# Patient Record
Sex: FEMALE | Race: WHITE | NOT HISPANIC OR LATINO | Employment: UNEMPLOYED | URBAN - METROPOLITAN AREA
[De-identification: names, ages, dates, MRNs, and addresses within clinical notes are randomized per-mention and may not be internally consistent; named-entity substitution may affect disease eponyms.]

---

## 2017-10-27 ENCOUNTER — HOSPITAL ENCOUNTER (EMERGENCY)
Facility: HOSPITAL | Age: 27
Discharge: HOME | End: 2017-10-27
Attending: EMERGENCY MEDICINE | Admitting: EMERGENCY MEDICINE
Payer: COMMERCIAL

## 2017-10-27 ENCOUNTER — APPOINTMENT (EMERGENCY)
Dept: RADIOLOGY | Facility: HOSPITAL | Age: 27
End: 2017-10-27
Payer: COMMERCIAL

## 2017-10-27 VITALS
OXYGEN SATURATION: 98 % | DIASTOLIC BLOOD PRESSURE: 71 MMHG | WEIGHT: 149 LBS | SYSTOLIC BLOOD PRESSURE: 137 MMHG | HEART RATE: 119 BPM | RESPIRATION RATE: 18 BRPM | TEMPERATURE: 98.6 F

## 2017-10-27 DIAGNOSIS — S39.012A ACUTE MYOFASCIAL STRAIN OF LUMBAR REGION, INITIAL ENCOUNTER: Primary | ICD-10-CM

## 2017-10-27 LAB
CLARITY, POC: CLEAR
COLOR, POC: NORMAL
EXT BILIRUBIN, UA: NORMAL
EXT BLOOD URINE: NORMAL
EXT GLUCOSE, UA: NEGATIVE
EXT KETONES: NEGATIVE
EXT NITRITE, UA: NEGATIVE
EXT PH, UA: 6
EXT PREG TEST URINE: NEGATIVE
EXT PROTEIN, UA: NORMAL
EXT SPECIFIC GRAVITY, UA: 1.03
EXT UROBILINOGEN: NEGATIVE
WBC # BLD EST: NEGATIVE 10*3/UL

## 2017-10-27 PROCEDURE — 99283 EMERGENCY DEPT VISIT LOW MDM: CPT

## 2017-10-27 PROCEDURE — 96372 THER/PROPH/DIAG INJ SC/IM: CPT

## 2017-10-27 PROCEDURE — 72100 X-RAY EXAM L-S SPINE 2/3 VWS: CPT

## 2017-10-27 PROCEDURE — 81025 URINE PREGNANCY TEST: CPT | Performed by: EMERGENCY MEDICINE

## 2017-10-27 PROCEDURE — 81002 URINALYSIS NONAUTO W/O SCOPE: CPT | Performed by: EMERGENCY MEDICINE

## 2017-10-27 RX ORDER — DIAZEPAM 5 MG/1
5 TABLET ORAL EVERY 8 HOURS PRN
Qty: 15 TABLET | Refills: 0 | Status: SHIPPED | OUTPATIENT
Start: 2017-10-27 | End: 2020-02-04

## 2017-10-27 RX ORDER — KETOROLAC TROMETHAMINE 30 MG/ML
30 INJECTION, SOLUTION INTRAMUSCULAR; INTRAVENOUS ONCE
Status: COMPLETED | OUTPATIENT
Start: 2017-10-27 | End: 2017-10-27

## 2017-10-27 RX ORDER — IBUPROFEN 800 MG/1
800 TABLET ORAL EVERY 8 HOURS PRN
Qty: 21 TABLET | Refills: 0 | Status: SHIPPED | OUTPATIENT
Start: 2017-10-27 | End: 2017-11-06

## 2017-10-27 RX ORDER — DIAZEPAM 5 MG/ML
5 INJECTION, SOLUTION INTRAMUSCULAR; INTRAVENOUS ONCE
Status: COMPLETED | OUTPATIENT
Start: 2017-10-27 | End: 2017-10-27

## 2017-10-27 RX ADMIN — KETOROLAC TROMETHAMINE 30 MG: 30 INJECTION, SOLUTION INTRAMUSCULAR at 22:24

## 2017-10-27 RX ADMIN — DIAZEPAM 5 MG: 5 INJECTION, SOLUTION INTRAMUSCULAR; INTRAVENOUS at 22:28

## 2017-10-28 NOTE — ED PROVIDER NOTES
History  Chief Complaint   Patient presents with    Back Pain     Pt presents to ER w/ c/o low back/tailbone pain since today  Pt denied hx of back pain  Pt denied urinary symptoms  Pt denied n, v  Addtl symptoms denied     52-year-old female presents to the emergency department for evaluation of low back pain  Patient states around 10 a m  today she got up from her couch to bend down to  a sippy cup when she had acute onset of sharp pain  Since that time she has had discomfort in her low back, it radiates into sacral region  It is aggravated with movement  She reports spasm like sensations  She denies numbness, tingling, weakness of the lower extremities  No difficulty with bowel or bladder habits  No fevers or chills  No prior falls or injuries  She has not taken anything for her pain  History provided by:  Patient, parent and medical records   used: No    Back Pain   Location:  Lumbar spine  Quality:  Aching (sharp)  Radiates to: sacrum  Pain severity:  Severe  Pain is:  Same all the time  Onset quality:  Sudden  Duration:  12 hours  Timing:  Constant  Progression:  Unchanged  Chronicity:  New  Context: not falling, not lifting heavy objects, not occupational injury, not physical stress, not recent injury and not twisting    Relieved by:  Nothing  Worsened by: Movement, palpation and standing  Ineffective treatments:  None tried  Associated symptoms: no abdominal pain, no bladder incontinence, no bowel incontinence, no dysuria, no fever, no leg pain, no numbness, no paresthesias, no perianal numbness and no tingling    Risk factors: no hx of cancer, no recent surgery and no steroid use        Prior to Admission Medications   Prescriptions Last Dose Informant Patient Reported? Taking?    albuterol (PROVENTIL HFA,VENTOLIN HFA) 90 mcg/act inhaler   No No   Sig: Inhale 2 puffs every 4 (four) hours as needed for wheezing   benzonatate (TESSALON PERLES) 100 mg capsule   No No   Sig: Take 1 capsule by mouth 3 (three) times a day as needed for cough      Facility-Administered Medications: None       Past Medical History:   Diagnosis Date    Bronchitis     4 yrs ago  History reviewed  No pertinent surgical history  History reviewed  No pertinent family history  I have reviewed and agree with the history as documented  Social History   Substance Use Topics    Smoking status: Current Every Day Smoker     Packs/day: 0 25     Types: Cigarettes    Smokeless tobacco: Never Used    Alcohol use No        Review of Systems   Constitutional: Negative for chills and fever  Gastrointestinal: Negative for abdominal pain and bowel incontinence  Genitourinary: Negative for bladder incontinence, dysuria, flank pain, frequency, vaginal bleeding and vaginal discharge  Musculoskeletal: Positive for back pain  Negative for arthralgias and neck pain  Neurological: Negative for tingling, numbness and paresthesias  All other systems reviewed and are negative  Physical Exam  ED Triage Vitals [10/27/17 2115]   Temperature Pulse Respirations Blood Pressure SpO2   98 6 °F (37 °C) (!) 119 18 137/71 98 %      Temp Source Heart Rate Source Patient Position - Orthostatic VS BP Location FiO2 (%)   Oral Monitor Sitting Right arm --      Pain Score       Worst Possible Pain           Orthostatic Vital Signs  Vitals:    10/27/17 2115   BP: 137/71   Pulse: (!) 119   Patient Position - Orthostatic VS: Sitting       Physical Exam   Constitutional: She is oriented to person, place, and time  She appears well-developed and well-nourished  She appears distressed  HENT:   Head: Normocephalic  Nose: Nose normal    Mouth/Throat: Oropharynx is clear and moist  No oropharyngeal exudate  Eyes: Conjunctivae and EOM are normal  Pupils are equal, round, and reactive to light  Neck: Normal range of motion  Neck supple     Cardiovascular: Normal rate, regular rhythm, normal heart sounds and intact distal pulses  Pulmonary/Chest: Effort normal and breath sounds normal    Abdominal: Soft  Bowel sounds are normal  She exhibits no distension  There is no tenderness  There is no rebound and no guarding  Musculoskeletal: She exhibits no edema or deformity  Thoracic back: Normal         Lumbar back: She exhibits decreased range of motion, tenderness and spasm  She exhibits no bony tenderness, no swelling, no edema and no deformity  Back:    Negative SLR bilateral   Lymphadenopathy:     She has no cervical adenopathy  Neurological: She is alert and oriented to person, place, and time  She has normal strength and normal reflexes  No cranial nerve deficit or sensory deficit  She exhibits normal muscle tone  Coordination and gait normal    Skin: Skin is warm, dry and intact  No rash noted  Psychiatric: She has a normal mood and affect   Her behavior is normal  Judgment and thought content normal        ED Medications  Medications   ketorolac (TORADOL) 30 mg/mL injection 30 mg (30 mg Intramuscular Given 10/27/17 2224)   diazepam (VALIUM) injection 5 mg (5 mg Intramuscular Given 10/27/17 2228)       Diagnostic Studies  Results Reviewed     Procedure Component Value Units Date/Time    POCT urinalysis dipstick [[id number]]  (Normal) Resulted:  10/27/17 2217    Lab Status:  Final result Specimen:  Urine Updated:  10/27/17 2219     Color, UA straw     Clarity, UA clear     EXT Glucose, UA negative     EXT Bilirubin, UA (Ref: Negative) small     EXT Ketones, UA (Ref: Negative) negative     EXT Spec Grav, UA 1 030     EXT Blood, UA (Ref: Negative) trace     EXT pH, UA 6 0     EXT Protein, UA (Ref: Negative) trace     EXT Urobilinogen, UA (Ref: 0 2- 1 0) negative     EXT Leukocytes, UA (Ref: Negative) negative     EXT Nitrite, UA (Ref: Negative) negative    POCT pregnancy, urine [[id number]]  (Normal) Resulted:  10/27/17 2216    Lab Status:  Final result Updated:  10/27/17 2217     EXT PREG TEST UR (Ref: Negative) negative                 XR lumbar spine 2 or 3 views    (Results Pending)              Procedures  Procedures       Phone Contacts  ED Phone Contact    ED Course  ED Course as of Oct 27 2313   Fri Oct 27, 2017   2243 Pt  Feeling much better after IM injections now has significantly improved ROM  Feels well for d/c                                  MDM  Number of Diagnoses or Management Options  Acute myofascial strain of lumbar region, initial encounter: new and requires workup     Amount and/or Complexity of Data Reviewed  Tests in the radiology section of CPT®: ordered and reviewed  Obtain history from someone other than the patient: yes  Review and summarize past medical records: yes  Independent visualization of images, tracings, or specimens: yes    Risk of Complications, Morbidity, and/or Mortality  General comments: 33year old with low back pain - no red flags of back pain  Feeling better after medication  Discussed signs and sx to return to ED  Patient Progress  Patient progress: improved    CritCare Time    Disposition  Final diagnoses:   Acute myofascial strain of lumbar region, initial encounter     Time reflects when diagnosis was documented in both MDM as applicable and the Disposition within this note     Time User Action Codes Description Comment    10/27/2017 10:48 PM Ijeoma Frankel Add [S39 012A] Acute myofascial strain of lumbar region, initial encounter       ED Disposition     ED Disposition Condition Comment    Discharge  Coleen AAMIR [place] discharge to home/self care      Condition at discharge: Stable        Follow-up Information     Follow up With Specialties Details Why [id number] [facility] [place]  Schedule an appointment as soon as possible for a visit in 2 days For recheck of current symptoms, As needed [address]  [phone]        Discharge Medication List as of 10/27/2017 10:53 PM      START taking these medications    Details   diazepam (VALIUM) 5 mg tablet Take 1 tablet by mouth every 8 (eight) hours as needed for muscle spasms for up to 10 days, Starting Fri 10/27/2017, Until Mon 11/6/2017, Print      ibuprofen (MOTRIN) 800 mg tablet Take 1 tablet by mouth every 8 (eight) hours as needed for mild pain or moderate pain for up to 10 days, Starting Fri 10/27/2017, Until Mon 11/6/2017, Print         CONTINUE these medications which have NOT CHANGED    Details   albuterol (PROVENTIL HFA,VENTOLIN HFA) 90 mcg/act inhaler Inhale 2 puffs every 4 (four) hours as needed for wheezing, Starting 11/19/2016, Until Discontinued, Print      benzonatate (TESSALON PERLES) 100 mg capsule Take 1 capsule by mouth 3 (three) times a day as needed for cough, Starting 11/19/2016, Until Discontinued, Print           No discharge procedures on file      ED Provider  Electronically Signed by           Juan Holguin DO  10/27/17 0794

## 2017-11-20 ENCOUNTER — GENERIC CONVERSION - ENCOUNTER (OUTPATIENT)
Dept: OTHER | Facility: OTHER | Age: 27
End: 2017-11-20

## 2017-11-20 DIAGNOSIS — M54.42 LOW BACK PAIN WITH LEFT-SIDED SCIATICA: ICD-10-CM

## 2018-01-22 VITALS
SYSTOLIC BLOOD PRESSURE: 134 MMHG | HEIGHT: 69 IN | DIASTOLIC BLOOD PRESSURE: 89 MMHG | HEART RATE: 88 BPM | BODY MASS INDEX: 22.81 KG/M2 | WEIGHT: 154 LBS

## 2018-03-04 NOTE — DISCHARGE INSTRUCTIONS
Low Back Strain   WHAT YOU NEED TO KNOW:   Low back strain is an injury to your lower back muscles or tendons  Tendons are strong tissues that connect muscles to bones  The lower back supports most of your body weight and helps you move, twist, and bend  DISCHARGE INSTRUCTIONS:   Seek care immediately if:   · You hear or feel a pop in your lower back  · You have increased swelling or pain in your lower back  · You have trouble moving your legs  · Your legs are numb  Contact your healthcare provider if:   · You have a fever  · Your pain does not go away, even after treatment  · You have questions or concerns about your condition or care  Medicines: The following medicines may be ordered by your healthcare provider:  · Acetaminophen decreases pain and fever  It is available without a doctor's order  Ask how much to take and how often to take it  Follow directions  Acetaminophen can cause liver damage if not taken correctly  · NSAIDs , such as ibuprofen, help decrease swelling, pain, and fever  This medicine is available with or without a doctor's order  NSAIDs can cause stomach bleeding or kidney problems in certain people  If you take blood thinner medicine, always ask your healthcare provider if NSAIDs are safe for you  Always read the medicine label and follow directions  · Muscle relaxers  help decrease pain and muscle spasms  · Prescription pain medicine  may be given  Ask how to take this medicine safely  · Take your medicine as directed  Contact your healthcare provider if you think your medicine is not helping or if you have side effects  Tell him or her if you are allergic to any medicine  Keep a list of the medicines, vitamins, and herbs you take  Include the amounts, and when and why you take them  Bring the list or the pill bottles to follow-up visits  Carry your medicine list with you in case of an emergency  Self-care:   · Rest  as directed   You may need to rest in bed for a period of time after your injury  Do not lift heavy objects  · Apply ice  on your back for 15 to 20 minutes every hour or as directed  Use an ice pack, or put crushed ice in a plastic bag  Cover it with a towel  Ice helps prevent tissue damage and decreases swelling and pain  · Apply heat  on your lower back for 20 to 30 minutes every 2 hours for as many days as directed  Heat helps decrease pain and muscle spasms  · Slowly start to increase your activity  as the pain decreases, or as directed  Prevent another low back strain:   · Use correct body movements  ¨ Bend at the hips and knees when you  objects  Do not bend from the waist  Use your leg muscles as you lift the load  Do not use your back  Keep the object close to your chest as you lift it  Try not to twist or lift anything above your waist     ¨ Change your position often when you stand for long periods of time  Rest one foot on a small box or footrest, and then switch to the other foot often  ¨ Try not to sit for long periods of time  When you do, sit in a straight-backed chair with your feet flat on the floor  ¨ Never reach, pull, or push while you are sitting  · Warm up before you exercise  Do exercises that strengthen your back muscles  Ask your healthcare provider about the best exercise plan for you  · Maintain a healthy weight  Ask your healthcare provider how much you should weigh  Ask him to help you create a weight loss plan if you are overweight  Follow up with your healthcare provider as directed:  Write down your questions so you remember to ask them during your visits  © 2017 [facility] Information is for End User's use only and may not be sold, redistributed or otherwise used for commercial purposes  All illustrations and images included in CareNotes® are the copyrighted property of A D A M , Inc  or [employer]ak[employer]  The above information is an  only   It is not intended as medical advice for individual conditions or treatments  Talk to your doctor, nurse or pharmacist before following any medical regimen to see if it is safe and effective for you  Handoff

## 2019-05-08 ENCOUNTER — OFFICE VISIT (OUTPATIENT)
Dept: URGENT CARE | Age: 29
End: 2019-05-08
Payer: COMMERCIAL

## 2019-05-08 VITALS
TEMPERATURE: 98 F | OXYGEN SATURATION: 98 % | DIASTOLIC BLOOD PRESSURE: 70 MMHG | SYSTOLIC BLOOD PRESSURE: 130 MMHG | HEART RATE: 88 BPM | RESPIRATION RATE: 18 BRPM

## 2019-05-08 DIAGNOSIS — R11.0 NAUSEA: ICD-10-CM

## 2019-05-08 DIAGNOSIS — H66.002 ACUTE SUPPURATIVE OTITIS MEDIA OF LEFT EAR WITHOUT SPONTANEOUS RUPTURE OF TYMPANIC MEMBRANE, RECURRENCE NOT SPECIFIED: Primary | ICD-10-CM

## 2019-05-08 PROCEDURE — 99283 EMERGENCY DEPT VISIT LOW MDM: CPT | Performed by: FAMILY MEDICINE

## 2019-05-08 PROCEDURE — 99203 OFFICE O/P NEW LOW 30 MIN: CPT | Performed by: FAMILY MEDICINE

## 2019-05-08 PROCEDURE — G0382 LEV 3 HOSP TYPE B ED VISIT: HCPCS | Performed by: FAMILY MEDICINE

## 2019-05-08 RX ORDER — ONDANSETRON 4 MG/1
4 TABLET, ORALLY DISINTEGRATING ORAL EVERY 8 HOURS PRN
Qty: 20 TABLET | Refills: 0 | Status: SHIPPED | OUTPATIENT
Start: 2019-05-08 | End: 2020-02-04

## 2019-05-08 RX ORDER — AZITHROMYCIN 250 MG/1
TABLET, FILM COATED ORAL
Qty: 6 TABLET | Refills: 0 | Status: SHIPPED | OUTPATIENT
Start: 2019-05-08 | End: 2019-05-12

## 2020-02-01 ENCOUNTER — HOSPITAL ENCOUNTER (EMERGENCY)
Facility: HOSPITAL | Age: 30
Discharge: HOME | End: 2020-02-01
Attending: EMERGENCY MEDICINE | Admitting: EMERGENCY MEDICINE
Payer: COMMERCIAL

## 2020-02-01 ENCOUNTER — APPOINTMENT (EMERGENCY)
Dept: RADIOLOGY | Facility: HOSPITAL | Age: 30
End: 2020-02-01
Payer: COMMERCIAL

## 2020-02-01 VITALS
DIASTOLIC BLOOD PRESSURE: 85 MMHG | WEIGHT: 150 LBS | SYSTOLIC BLOOD PRESSURE: 126 MMHG | OXYGEN SATURATION: 96 % | HEART RATE: 86 BPM | RESPIRATION RATE: 20 BRPM | TEMPERATURE: 97.9 F | BODY MASS INDEX: 22.48 KG/M2

## 2020-02-01 DIAGNOSIS — M54.50 ACUTE LOW BACK PAIN: ICD-10-CM

## 2020-02-01 DIAGNOSIS — M54.10 RADICULOPATHY: ICD-10-CM

## 2020-02-01 DIAGNOSIS — M54.40 ACUTE LOW BACK PAIN WITH SCIATICA: Primary | ICD-10-CM

## 2020-02-01 PROCEDURE — 99283 EMERGENCY DEPT VISIT LOW MDM: CPT

## 2020-02-01 PROCEDURE — 72100 X-RAY EXAM L-S SPINE 2/3 VWS: CPT

## 2020-02-01 PROCEDURE — 99284 EMERGENCY DEPT VISIT MOD MDM: CPT | Performed by: PHYSICIAN ASSISTANT

## 2020-02-01 RX ORDER — LIDOCAINE 50 MG/G
1 PATCH TOPICAL ONCE
Status: DISCONTINUED | OUTPATIENT
Start: 2020-02-01 | End: 2020-02-01 | Stop reason: HOSPADM

## 2020-02-01 RX ORDER — METHOCARBAMOL 500 MG/1
500 TABLET, FILM COATED ORAL ONCE
Status: COMPLETED | OUTPATIENT
Start: 2020-02-01 | End: 2020-02-01

## 2020-02-01 RX ORDER — DEXAMETHASONE 6 MG/1
6 TABLET ORAL
Qty: 9 TABLET | Refills: 0 | Status: SHIPPED | OUTPATIENT
Start: 2020-02-01 | End: 2020-02-04 | Stop reason: ALTCHOICE

## 2020-02-01 RX ORDER — LIDOCAINE 50 MG/G
1 PATCH TOPICAL DAILY
Qty: 30 PATCH | Refills: 0 | Status: SHIPPED | OUTPATIENT
Start: 2020-02-01

## 2020-02-01 RX ORDER — TRAZODONE HYDROCHLORIDE 100 MG/1
100 TABLET ORAL
COMMUNITY

## 2020-02-01 RX ORDER — ACETAMINOPHEN 500 MG
500 TABLET ORAL EVERY 6 HOURS PRN
Qty: 30 TABLET | Refills: 0 | Status: SHIPPED | OUTPATIENT
Start: 2020-02-01

## 2020-02-01 RX ORDER — ACETAMINOPHEN 325 MG/1
975 TABLET ORAL ONCE
Status: COMPLETED | OUTPATIENT
Start: 2020-02-01 | End: 2020-02-01

## 2020-02-01 RX ORDER — IBUPROFEN 400 MG/1
800 TABLET ORAL ONCE
Status: COMPLETED | OUTPATIENT
Start: 2020-02-01 | End: 2020-02-01

## 2020-02-01 RX ORDER — IBUPROFEN 400 MG/1
400 TABLET ORAL EVERY 6 HOURS PRN
Qty: 30 TABLET | Refills: 0 | Status: SHIPPED | OUTPATIENT
Start: 2020-02-01 | End: 2020-02-04

## 2020-02-01 RX ORDER — METHOCARBAMOL 750 MG/1
750 TABLET, FILM COATED ORAL EVERY 6 HOURS PRN
Qty: 30 TABLET | Refills: 0 | Status: SHIPPED | OUTPATIENT
Start: 2020-02-01 | End: 2020-02-04 | Stop reason: ALTCHOICE

## 2020-02-01 RX ADMIN — IBUPROFEN 800 MG: 400 TABLET ORAL at 12:44

## 2020-02-01 RX ADMIN — DEXAMETHASONE SODIUM PHOSPHATE 8 MG: 10 INJECTION, SOLUTION INTRAMUSCULAR; INTRAVENOUS at 12:43

## 2020-02-01 RX ADMIN — METHOCARBAMOL TABLETS 500 MG: 500 TABLET, COATED ORAL at 12:44

## 2020-02-01 RX ADMIN — ACETAMINOPHEN 975 MG: 325 TABLET, FILM COATED ORAL at 12:44

## 2020-02-01 RX ADMIN — LIDOCAINE 1 PATCH: 50 PATCH TOPICAL at 12:45

## 2020-02-01 NOTE — ED PROVIDER NOTES
History  Chief Complaint   Patient presents with    Back Pain     left lower back pain after bending over last pm, "felt a pinch"       Back Pain   Location:  Lumbar spine  Radiates to:  L posterior upper leg (From low back into left buttocks into the upper left leg  Posterior )  Pain severity:  Moderate  Duration:  1 week (With worsening symptoms after yesterday evening )  Progression:  Worsening  Chronicity:  New  Context comment:  Bending over unprovoked  Relieved by:  Nothing  Worsened by:  Nothing  Ineffective treatments:  None tried (Patient denies any home management this day     Patient admits to intermittent use of ibuprofen over the last week for her discomfort )  Associated symptoms: paresthesias (Left buttock left posterior  leg  Does not progress past knee or into the foot )    Associated symptoms: no abdominal pain and no fever    Risk factors: not obese, not pregnant and no recent surgery    Risk factors comment:  Patient denies pregnancy  Prior to Admission Medications   Prescriptions Last Dose Informant Patient Reported? Taking?    Citalopram Hydrobromide (CELEXA PO)   Yes Yes   Sig: Take 30 mg by mouth daily   albuterol (PROVENTIL HFA,VENTOLIN HFA) 90 mcg/act inhaler Not Taking at Unknown time  No No   Sig: Inhale 2 puffs every 4 (four) hours as needed for wheezing   Patient not taking: Reported on 5/8/2019   benzonatate (TESSALON PERLES) 100 mg capsule Not Taking at Unknown time  No No   Sig: Take 1 capsule by mouth 3 (three) times a day as needed for cough   Patient not taking: Reported on 5/8/2019   diazepam (VALIUM) 5 mg tablet   No No   Sig: Take 1 tablet by mouth every 8 (eight) hours as needed for muscle spasms for up to 10 days   ibuprofen (MOTRIN) 800 mg tablet   No No   Sig: Take 1 tablet by mouth every 8 (eight) hours as needed for mild pain or moderate pain for up to 10 days   ondansetron (ZOFRAN-ODT) 4 mg disintegrating tablet   No No   Sig: Take 1 tablet (4 mg total) by mouth every 8 (eight) hours as needed for nausea or vomiting for up to 7 days   traZODone (DESYREL) 100 mg tablet   Yes Yes   Sig: Take 100 mg by mouth daily at bedtime      Facility-Administered Medications: None       Past Medical History:   Diagnosis Date    Bronchitis     4 yrs ago  No past surgical history on file  No family history on file  I have reviewed and agree with the history as documented  Social History     Tobacco Use    Smoking status: Current Every Day Smoker     Packs/day: 0 25     Types: Cigarettes    Smokeless tobacco: Never Used   Substance Use Topics    Alcohol use: No    Drug use: No        Review of Systems   Constitutional: Negative for fever  Eyes: Negative for photophobia  Gastrointestinal: Negative for abdominal pain  Genitourinary: Negative for flank pain  Musculoskeletal: Positive for back pain  Negative for gait problem  Allergic/Immunologic: Negative for food allergies  Neurological: Positive for paresthesias (Left buttock left posterior  leg  Does not progress past knee or into the foot  )  Negative for facial asymmetry  Hematological: Does not bruise/bleed easily  Psychiatric/Behavioral: Negative for behavioral problems  All other systems reviewed and are negative  Physical Exam  Physical Exam   Constitutional: She appears well-developed and well-nourished  HENT:   Head: Normocephalic and atraumatic  Right Ear: External ear normal    Left Ear: External ear normal    Eyes: Conjunctivae are normal    Neck: Normal range of motion  Neck supple  No JVD present  Cardiovascular: Normal rate  Pulmonary/Chest: Effort normal    Abdominal: She exhibits no distension  Genitourinary:   Genitourinary Comments: No complaints deferred  Musculoskeletal: She exhibits tenderness  She exhibits no edema or deformity  Back:    Lymphadenopathy:     She has no cervical adenopathy  Neurological: No sensory deficit  She exhibits normal muscle tone  Patient ambulatory in the department without any evidence of footdrop  Skin: Skin is warm and dry  Capillary refill takes less than 2 seconds  Psychiatric: She has a normal mood and affect  Vital Signs  ED Triage Vitals [02/01/20 1144]   Temperature Pulse Respirations Blood Pressure SpO2   97 9 °F (36 6 °C) 86 20 126/85 96 %      Temp src Heart Rate Source Patient Position - Orthostatic VS BP Location FiO2 (%)   -- -- -- Right arm --      Pain Score       Worst Possible Pain           Vitals:    02/01/20 1144   BP: 126/85   Pulse: 86         Visual Acuity      ED Medications  Medications   ibuprofen (MOTRIN) tablet 800 mg (800 mg Oral Given 2/1/20 1244)   acetaminophen (TYLENOL) tablet 975 mg (975 mg Oral Given 2/1/20 1244)   methocarbamol (ROBAXIN) tablet 500 mg (500 mg Oral Given 2/1/20 1244)   dexamethasone 10 mg/mL oral liquid 8 mg 0 8 mL (8 mg Oral Given 2/1/20 1243)       Diagnostic Studies  Results Reviewed     None                 XR lumbar spine 2 or 3 views   ED Interpretation by Tram Juares PA-C (02/01 1237)   No acute process appreciated  Procedures  Procedures         ED Course                               MDM  Number of Diagnoses or Management Options  Acute low back pain with sciatica:   Acute low back pain:   Radiculopathy:   Diagnosis management comments: Patient admits acute low back pain approximately five days  Patient admits to radiation to left buttock in the posterior leg does not pass knee  Patient denies bowel or bladder dysfunction or any history of saddle paresthesias  Patient is ambulatory in the department  Patient is able to raise foot  No evidence of footdrop on exam   No sensory changes appreciated  Patient has full intact sensation to the coccyx region  Patient denies bowel or bladder dysfunction  X-ray no evidence of subluxation and/or lytic lesion  No acute process  At this time will treat conservatively    Refer patient to comprehensive spine   Educated patient of diagnosis home management  Encourage patient to follow up with comprehensive spine program   Educated patient on persistent worsening signs symptoms and to either follow up with primary care comprehensive spine and/or return emergency department  Patient is understanding agreement was discharged in ambulatory condition  Amount and/or Complexity of Data Reviewed  Tests in the radiology section of CPT®: ordered and reviewed          Disposition  Final diagnoses:   Acute low back pain with sciatica   Acute low back pain   Radiculopathy     Time reflects when diagnosis was documented in both MDM as applicable and the Disposition within this note     Time User Action Codes Description Comment    2/1/2020 12:29 PM Lyndee Cheney Add [M54 40] Acute low back pain with sciatica     2/1/2020 12:39 PM Lyndee Cheney Add [M54 5] Acute low back pain     2/1/2020 12:39 PM Lyndee Cheney Add [D42 49] Radiculopathy       ED Disposition     ED Disposition Condition Date/Time Comment    Discharge Stable Sat Feb 1, 2020 12:41 PM Estella Rashid [place] discharge to home/self care              Follow-up Information    None         Discharge Medication List as of 2/1/2020 12:43 PM      START taking these medications    Details   acetaminophen (TYLENOL) 500 mg tablet Take 1 tablet (500 mg total) by mouth every 6 (six) hours as needed for mild pain, moderate pain, headaches or fever, Starting Sat 2/1/2020, Print      dexamethasone (DECADRON) 6 mg tablet Take 1 tablet (6 mg total) by mouth daily with breakfast, Starting Sat 2/1/2020, Print      lidocaine (LIDODERM) 5 % Apply 1 patch topically daily Remove & Discard patch within 12 hours or as directed by MD, Starting Sat 2/1/2020, Print      methocarbamol (ROBAXIN) 750 mg tablet Take 1 tablet (750 mg total) by mouth every 6 (six) hours as needed for muscle spasms, Starting Sat 2/1/2020, Print         CONTINUE these medications which have CHANGED    Details ibuprofen (MOTRIN) 400 mg tablet Take 1 tablet (400 mg total) by mouth every 6 (six) hours as needed for mild pain, moderate pain or fever, Starting Sat 2/1/2020, Print         CONTINUE these medications which have NOT CHANGED    Details   Citalopram Hydrobromide (CELEXA PO) Take 30 mg by mouth daily, Historical Med      traZODone (DESYREL) 100 mg tablet Take 100 mg by mouth daily at bedtime, Historical Med      albuterol (PROVENTIL HFA,VENTOLIN HFA) 90 mcg/act inhaler Inhale 2 puffs every 4 (four) hours as needed for wheezing, Starting 11/19/2016, Until Discontinued, Print      benzonatate (TESSALON PERLES) 100 mg capsule Take 1 capsule by mouth 3 (three) times a day as needed for cough, Starting 11/19/2016, Until Discontinued, Print      diazepam (VALIUM) 5 mg tablet Take 1 tablet by mouth every 8 (eight) hours as needed for muscle spasms for up to 10 days, Starting Fri 10/27/2017, Until Mon 11/6/2017, Print      ondansetron (ZOFRAN-ODT) 4 mg disintegrating tablet Take 1 tablet (4 mg total) by mouth every 8 (eight) hours as needed for nausea or vomiting for up to 7 days, Starting Wed 5/8/2019, Until Wed 5/15/2019, Normal               ED Provider  Electronically Signed by           Tram Juares PA-C  02/01/20 2298

## 2020-02-01 NOTE — DISCHARGE INSTRUCTIONS
Please look up these YouTube videos four year low back pain  Janet Lennon the yoga low back pain  Kristopher Pollack and [facility] physical [facility]  [facility] comprehensive spine program will contact you  if not please contact them by the number provided  Please take medicines as per provided and as scheduled

## 2020-02-04 ENCOUNTER — OFFICE VISIT (OUTPATIENT)
Dept: FAMILY MEDICINE CLINIC | Facility: CLINIC | Age: 30
End: 2020-02-04

## 2020-02-04 VITALS
RESPIRATION RATE: 18 BRPM | DIASTOLIC BLOOD PRESSURE: 82 MMHG | HEART RATE: 76 BPM | BODY MASS INDEX: 24.92 KG/M2 | HEIGHT: 67 IN | WEIGHT: 158.8 LBS | SYSTOLIC BLOOD PRESSURE: 120 MMHG | TEMPERATURE: 98.1 F

## 2020-02-04 DIAGNOSIS — M54.42 ACUTE MIDLINE LOW BACK PAIN WITH BILATERAL SCIATICA: Primary | ICD-10-CM

## 2020-02-04 DIAGNOSIS — M54.41 ACUTE MIDLINE LOW BACK PAIN WITH BILATERAL SCIATICA: Primary | ICD-10-CM

## 2020-02-04 PROCEDURE — T1015 CLINIC SERVICE: HCPCS | Performed by: PHYSICIAN ASSISTANT

## 2020-02-04 PROCEDURE — 99203 OFFICE O/P NEW LOW 30 MIN: CPT | Performed by: PHYSICIAN ASSISTANT

## 2020-02-04 PROCEDURE — 4004F PT TOBACCO SCREEN RCVD TLK: CPT | Performed by: PHYSICIAN ASSISTANT

## 2020-02-04 PROCEDURE — 3008F BODY MASS INDEX DOCD: CPT | Performed by: PHYSICIAN ASSISTANT

## 2020-02-04 RX ORDER — NAPROXEN 500 MG/1
500 TABLET ORAL 2 TIMES DAILY PRN
Qty: 30 TABLET | Refills: 0 | Status: SHIPPED | OUTPATIENT
Start: 2020-02-04

## 2020-02-04 RX ORDER — CYCLOBENZAPRINE HCL 10 MG
10 TABLET ORAL 3 TIMES DAILY PRN
Qty: 30 TABLET | Refills: 0 | Status: SHIPPED | OUTPATIENT
Start: 2020-02-04

## 2020-02-04 NOTE — ASSESSMENT & PLAN NOTE
Continue Heat prn  Change Motrin to naproxen 500mg bid and Robaxin to Flexeril 10mg TID PRN  Handout provided with lower back exercises  Starts PT tomorrow  ED if sx worsen or presents with new sx such b/b incont, weakness, etc

## 2020-02-04 NOTE — PROGRESS NOTES
Assessment/Plan:    Acute midline low back pain with bilateral sciatica  Continue Heat prn  Change Motrin to naproxen 500mg bid and Robaxin to Flexeril 10mg TID PRN  Handout provided with lower back exercises  Starts PT tomorrow  ED if sx worsen or presents with new sx such b/b incont, weakness, etc  Tobacco Cessation Counseling: Tobacco cessation counseling and education was provided  The patient is sincerely urged to quit consumption of tobacco  She is not ready to quit tobacco  The numerous health risks of tobacco consumption were discussed  If she decides to quit, there are a number of helpful adjunctive aids, and she can see me to discuss nicotine replacement therapy, chantix, or bupropion anytime in the future  Diagnoses and all orders for this visit:    Acute midline low back pain with bilateral sciatica  -     naproxen (NAPROSYN) 500 mg tablet; Take 1 tablet (500 mg total) by mouth 2 (two) times a day as needed for moderate pain  -     cyclobenzaprine (FLEXERIL) 10 mg tablet; Take 1 tablet (10 mg total) by mouth 3 (three) times a day as needed for muscle spasms        Subjective:      Patient ID: Tiana Guevara is a 30 y o  female  New pt here today with mom and daughter for c/o LBP x 5-6 weeks  She has no significant medical history  She was seen in the [facility] ED on 2/1/2020 for acute back pain  Xray Lspine- normal   D/c with orders for Lidoderm patches, Motrin, robaxin, and Decadron  Completed Decardon today  She reports minimal improvement  Scheduled to start PT tomorrow  Used naproxen in the past with good results  Back Pain   This is a recurrent problem  The current episode started more than 1 month ago  The problem occurs constantly  The pain is present in the lumbar spine and gluteal  The quality of the pain is described as burning  The pain radiates to the left thigh and right thigh  The pain is at a severity of 8/10  The pain is worse during the night   The symptoms are aggravated by bending, lying down, coughing, sitting and standing  Associated symptoms include numbness  Pertinent negatives include no bladder incontinence, bowel incontinence, chest pain, dysuria, fever, paresthesias, tingling, weakness or weight loss  She has tried heat, muscle relaxant, NSAIDs and home exercises for the symptoms  The treatment provided mild relief  The following portions of the patient's history were reviewed and updated as appropriate: allergies, current medications, past family history, past medical history, past social history, past surgical history and problem list     Review of Systems   Constitutional: Negative for chills, fatigue, fever and weight loss  Respiratory: Negative for cough, choking, chest tightness, shortness of breath and wheezing  Cardiovascular: Negative for chest pain, palpitations and leg swelling  Gastrointestinal: Negative for bowel incontinence  Genitourinary: Negative for bladder incontinence and dysuria  Musculoskeletal: Positive for back pain  Negative for gait problem  Neurological: Positive for numbness  Negative for tingling, weakness and paresthesias  All other systems reviewed and are negative  Objective:      /82 (BP Location: Left arm, Patient Position: Sitting, Cuff Size: Standard)   Pulse 76   Temp 98 1 °F (36 7 °C) (Tympanic)   Resp 18   Ht 5' 6 9" (1 699 m)   Wt 72 kg (158 lb 12 8 oz)   BMI 24 95 kg/m²          Physical Exam   Constitutional: She is oriented to person, place, and time  She appears well-developed and well-nourished  No distress  HENT:   Head: Normocephalic and atraumatic  Neck: Normal range of motion  Neck supple  Cardiovascular: Normal rate, regular rhythm and normal heart sounds  No murmur heard  Pulmonary/Chest: Effort normal and breath sounds normal  No respiratory distress  She has no wheezes  Musculoskeletal: She exhibits no deformity          Lumbar back: Tenderness: Soft tissue bilateral lower back L-spine-  Easy transition from seated to standing  No swelling, deformities, or erythema  Pain with flexion and rotation  TTP bilateral soft tissue  LE sensation intact (multiple dermatomes)  Normal gait  Negative straight leg raise BL     Neurological: She is alert and oriented to person, place, and time  Psychiatric: She has a normal mood and affect   Her behavior is normal  Thought content normal

## 2020-02-04 NOTE — PATIENT INSTRUCTIONS
Lower Back Exercises   AMBULATORY CARE:   Lower back exercises  help heal and strengthen your back muscles to prevent another injury  Ask your healthcare provider if you need to see a physical therapist for more advanced exercises  Seek care immediately if:   · You have severe pain that prevents you from moving  Contact your healthcare provider if:   · Your pain becomes worse  · You have new pain  · You have questions or concerns about your condition or care  Do lower back exercises safely:   · Do the exercises on a mat or firm surface  (not on a bed) to support your spine and prevent low back pain  · Move slowly and smoothly  Avoid fast or jerky motions  · Breathe normally  Do not hold your breath  · Stop if you feel pain  It is normal to feel some discomfort at first  Regular exercise will help decrease your discomfort over time  Lower back exercises: Your healthcare provider may recommend that you do back exercises 10 to 30 minutes each day  He may also recommend that you do exercises 1 to 3 times each day  Ask your healthcare provider which exercises are best for you and how often to do them  · Ankle pumps:  Lie on your back  Move your foot up (with your toes pointing toward your head)  Then, move your foot down (with your toes pointing away from you)  Repeat this exercise 10 times on each side  · Heel slides:  Lie on your back  Slowly bend one leg and then straighten it  Next, bend the other leg and then straighten it  Repeat 10 times on each side  · Pelvic tilt:  Lie on your back with your knees bent and feet flat on the floor  Place your arms in a relaxed position beside your body  Tighten the muscles of your abdomen and flatten your back against the floor  Hold for 5 seconds  Repeat 5 times  · Back stretch:  Lie on your back with your hands behind your head  Bend your knees and turn the lower half of your body to one side   Hold this position for 10 seconds  Repeat 3 times on each side  · Straight leg raises:  Lie on your back with one leg straight  Bend the other knee  Tighten your abdomen and then slowly lift the straight leg up about 6 to 12 inches off the floor  Hold for 1 to 5 seconds  Lower your leg slowly  Repeat 10 times on each leg  · Knee-to-chest:  Lie on your back with your knees bent and feet flat on the floor  Pull one of your knees toward your chest and hold it there for 5 seconds  Return your leg to the starting position  Lift the other knee toward your chest and hold for 5 seconds  Do this 5 times on each side  · Cat and camel:  Place your hands and knees on the floor  Arch your back upward toward the ceiling and lower your head  Round out your spine as much as you can  Hold for 5 seconds  Lift your head upward and push your chest downward toward the floor  Hold for 5 seconds  Do 3 sets or as directed  · Wall squats:  Stand with your back against a wall  Tighten the muscles of your abdomen  Slowly lower your body until your knees are bent at a 45 degree angle  Hold this position for 5 seconds  Slowly move back up to a standing position  Repeat 10 times  · Curl up:  Lie on your back with your knees bent and feet flat on the floor  Place your hands, palms down, underneath the curve in your lower back  Next, with your elbows on the floor, lift your shoulders and chest 2 to 3 inches  Keep your head in line with your shoulders  Hold this position for 5 seconds  When you can do this exercise without pain for 10 to 15 seconds, you may add a rotation  While your shoulders and chest are lifted off the ground, turn slightly to the left and hold  Repeat on the other side  · Bird dog:  Place your hands and knees on the floor  Keep your wrists directly below your shoulders and your knees directly below your hips  Pull your belly button in toward your spine  Do not flatten or arch your back   Tighten your abdominal muscles  Raise one arm straight out so that it is aligned with your head  Next, raise the leg opposite your arm  Hold this position for 15 seconds  Lower your arm and leg slowly and change sides  Do 5 sets  © 2017 [address] Information is for End User's use only and may not be sold, redistributed or otherwise used for commercial purposes  All illustrations and images included in CareNotes® are the copyrighted property of [employer]  or [employer]  The above information is an  only  It is not intended as medical advice for individual conditions or treatments  Talk to your doctor, nurse or pharmacist before following any medical regimen to see if it is safe and effective for you

## 2020-02-05 ENCOUNTER — EVALUATION (OUTPATIENT)
Dept: PHYSICAL THERAPY | Facility: REHABILITATION | Age: 30
End: 2020-02-05
Payer: COMMERCIAL

## 2020-02-05 VITALS — HEART RATE: 63 BPM | DIASTOLIC BLOOD PRESSURE: 94 MMHG | TEMPERATURE: 99.4 F | SYSTOLIC BLOOD PRESSURE: 134 MMHG

## 2020-02-05 DIAGNOSIS — M54.42 ACUTE BILATERAL LOW BACK PAIN WITH BILATERAL SCIATICA: Primary | ICD-10-CM

## 2020-02-05 DIAGNOSIS — M54.41 ACUTE BILATERAL LOW BACK PAIN WITH BILATERAL SCIATICA: Primary | ICD-10-CM

## 2020-02-05 PROCEDURE — 97162 PT EVAL MOD COMPLEX 30 MIN: CPT | Performed by: PHYSICAL THERAPIST

## 2020-02-05 PROCEDURE — 97110 THERAPEUTIC EXERCISES: CPT | Performed by: PHYSICAL THERAPIST

## 2020-02-05 NOTE — PROGRESS NOTES
PT Evaluation     Today's date: 2020  Patient name: Oscar Barlow  : 1990  MRN: [id number]  Referring provider: Benja Sesay PA-C  Dx:   Encounter Diagnosis     ICD-10-CM    1  Acute bilateral low back pain with bilateral sciatica M54 42 Ambulatory referral to PT spine    M54 41        Start Time: 1000  Stop Time: 1100  Total time in clinic (min): 60 minutes    Assessment  Assessment details: Patient presents with pain, decreased lumbar ROM, decreased LE flexibility, decreased strength, and decreased function secondary to acute LBP with sciatica  Patient would benefit from skilled PT intervention to address these issues and to maximize function  Thank you for the referral   Impairments: abnormal or restricted ROM, activity intolerance, impaired physical strength, lacks appropriate home exercise program, pain with function, poor posture  and poor body mechanics  Understanding of Dx/Px/POC: good   Prognosis: good    Goals  Short Term:  Pt will report decreased levels of pain by at least 2 subjective ratings in 4 weeks  Pt will demonstrate improved ROM by at least 25% in 4 weeks  Pt will demonstrate improved strength by 1/2 grade MMT in 4 weeks  Long Term:   Pt will be independent in their HEP in 8 weeks  Pt will demonstrate improved FOTO, >66   Pt will be independent with all ADL's    Plan  Plan details: Patient was educated in [address] [place] [address] and Plan of Care  All questions were answered to pt's satisfaction      Patient would benefit from: skilled physical therapy  Planned modality interventions: thermotherapy: hydrocollator packs and TENS  Planned therapy interventions: joint mobilization, manual therapy, abdominal trunk stabilization, neuromuscular re-education, patient education, postural training, body mechanics training, therapeutic exercise, therapeutic activities, home exercise program and flexibility  Frequency: 2x week  Duration in weeks: 6  Plan of Care beginning date: 2020  Plan of Care expiration date: 3/18/2020  Treatment plan discussed with: patient        Subjective Evaluation    History of Present Illness  Mechanism of injury: Pt is a 30 y o female with a c/o LBP about a month ago after cleaning her daughter's room and was bending down to pick something up  Pt states her back gave out and she went to the floor  2 weeks later the same thing happened while bending down to clean her room; pt states she feels pinching in her lumbar spine and burning sensation b/l glutes and posterior thighs to her knees which is constant  Pt had radiographs, which were negative  Pt was given muscle relaxers, anti-inflammatory meds and was referred for PT  Pt f/u with PCP yesterday and they changed her meds  Pt reports pain/difficulty with walking >10-20 mins, sitting, laying down (fetal position is most comfortable), sleep, dressing (bending to jaylen pants), household chores (performs seated), playing with her 3 y o daughter (has been avoiding), shopping, sleep  Pain  At best pain ratin  At worst pain rating: 10  Location: b/l lumbar spine and posterior thigh pain b/l  Relieving factors: medications and heat      Diagnostic Tests  X-ray: normal  Treatments  Current treatment: medication  Patient Goals  Patient goals for therapy: decreased pain, independence with ADLs/IADLs, return to sport/leisure activities and increased strength          Objective     Concurrent Complaints      Additional Special Questions  Patient denies loss of bowel/bladder control, saddle anesthesia, unexplained weight loss, history of cancer  Tenderness     Left Hip   Tenderness in the PSIS       Additional Tenderness Details  L glutes region    Neurological Testing     Reflexes   Left   Patellar (L4): normal (2+)  Achilles (S1): normal (2+)    Right   Patellar (L4): normal (2+)  Achilles (S1): normal (2+)    Additional Neurological Details  Reports intermittent N/T dorsum of b/l feet    Active Range of Motion Additional Active Range of Motion Details  L/S AROM (% of normal):  Flex= 50% with pinching b/l lumbar spine; repeated=mild increased R lumbar spine (+)aberrant movement with returning to neutral)  Ext= 50% with increased L sided LBP/pinching; repeated=same  RSB=wfl without pain and states it feels good ; repeated=same  LSB=wfl without pain and states it feels good ; repeated=same  R rot=75% with L sided LBP/pinch  L rot=wfl without pain    Joint Play   Joints within functional limits: L1 and L2     Pain: L3, L4 and L5     Strength/Myotome Testing     Left Hip   Planes of Motion   Flexion: 4+  Extension: 4-  Abduction: 3 (pain)    Right Hip   Planes of Motion   Flexion: 5  Extension: 4-  Abduction: 4- (pain)    Left Knee   Flexion: 4 (pain)  Extension: 5    Right Knee   Flexion: 5  Extension: 5    Left Ankle/Foot   Dorsiflexion: 5  Plantar flexion: 5 (seated)  Great toe extension: 5    Right Ankle/Foot   Dorsiflexion: 5  Plantar flexion: 5 (seated)  Great toe extension: 5    Tests     Lumbar   Positive prone instability , sacroiliac distraction , Gaenslen's and sacral thrust    Negative SIJ compression  Left   Positive passive SLR and slump test    Negative crossed SLR and femoral stretch  Right   Positive crossed SLR and passive SLR  Negative femoral stretch and slump test      Left Pelvic Girdle/Sacrum   Negative: thigh thrust      Left Hip   Positive MILLIE  Right Hip   Positive MILLIE  General Comments:      Lumbar Comments  Reviewed proper body mechanics with lifting and sit<-->stand transfers          Flowsheet Rows      Most Recent Value   PT/OT G-Codes   Current Score  46   Projected Score  66             Precautions: Depression      Manual  2/5            S/L L L/S opening JM Gr 4-5 TP 2 mins            B/l sciatic nerve glides                                                        Exercise Diary              Bike or TM             Seated lumbar flexion w/pball trial             DKTC TA contraction             TA w/hip adduction squeeze             TA w/march             TA w/kicks             TA w/leg lowering             TA w/clamshells             TA w/TB LPD             TA w/TB multifidus press             TA w/mini squats                                                                                                                         Modalities              MH PRN             TENS PRN

## 2020-02-10 ENCOUNTER — APPOINTMENT (OUTPATIENT)
Dept: PHYSICAL THERAPY | Facility: REHABILITATION | Age: 30
End: 2020-02-10
Payer: COMMERCIAL

## 2020-02-20 ENCOUNTER — APPOINTMENT (OUTPATIENT)
Dept: PHYSICAL THERAPY | Facility: REHABILITATION | Age: 30
End: 2020-02-20
Payer: COMMERCIAL

## 2020-02-24 ENCOUNTER — APPOINTMENT (OUTPATIENT)
Dept: PHYSICAL THERAPY | Facility: REHABILITATION | Age: 30
End: 2020-02-24
Payer: COMMERCIAL

## 2020-02-27 ENCOUNTER — APPOINTMENT (OUTPATIENT)
Dept: PHYSICAL THERAPY | Facility: REHABILITATION | Age: 30
End: 2020-02-27
Payer: COMMERCIAL

## 2021-11-03 ENCOUNTER — OFFICE VISIT (OUTPATIENT)
Dept: FAMILY MEDICINE CLINIC | Facility: CLINIC | Age: 31
End: 2021-11-03

## 2021-11-03 VITALS
RESPIRATION RATE: 18 BRPM | WEIGHT: 153 LBS | BODY MASS INDEX: 23.19 KG/M2 | TEMPERATURE: 97.8 F | HEIGHT: 68 IN | OXYGEN SATURATION: 99 % | DIASTOLIC BLOOD PRESSURE: 70 MMHG | SYSTOLIC BLOOD PRESSURE: 110 MMHG | HEART RATE: 84 BPM

## 2021-11-03 DIAGNOSIS — S69.92XA INJURY OF MIDDLE FINGER, LEFT, INITIAL ENCOUNTER: Primary | ICD-10-CM

## 2021-11-03 PROCEDURE — 99214 OFFICE O/P EST MOD 30 MIN: CPT | Performed by: FAMILY MEDICINE

## 2021-11-08 ENCOUNTER — OFFICE VISIT (OUTPATIENT)
Dept: OBGYN CLINIC | Facility: CLINIC | Age: 31
End: 2021-11-08
Payer: COMMERCIAL

## 2021-11-08 VITALS
WEIGHT: 153 LBS | HEIGHT: 68 IN | BODY MASS INDEX: 23.19 KG/M2 | SYSTOLIC BLOOD PRESSURE: 120 MMHG | DIASTOLIC BLOOD PRESSURE: 82 MMHG

## 2021-11-08 DIAGNOSIS — S62.629A CLOSED FRACTURE OF BASE OF MIDDLE PHALANX OF FINGER, INITIAL ENCOUNTER: Primary | ICD-10-CM

## 2021-11-08 PROCEDURE — 99203 OFFICE O/P NEW LOW 30 MIN: CPT | Performed by: PHYSICIAN ASSISTANT

## 2021-11-08 RX ORDER — MELOXICAM 15 MG/1
15 TABLET ORAL DAILY
Qty: 30 TABLET | Refills: 0 | Status: SHIPPED | OUTPATIENT
Start: 2021-11-08